# Patient Record
Sex: FEMALE | Race: WHITE | NOT HISPANIC OR LATINO | Employment: OTHER | URBAN - NONMETROPOLITAN AREA
[De-identification: names, ages, dates, MRNs, and addresses within clinical notes are randomized per-mention and may not be internally consistent; named-entity substitution may affect disease eponyms.]

---

## 2017-02-28 RX ORDER — CARVEDILOL 3.12 MG/1
TABLET ORAL
Qty: 180 TABLET | Refills: 2 | Status: SHIPPED | OUTPATIENT
Start: 2017-02-28

## 2017-09-25 ENCOUNTER — OFFICE VISIT (OUTPATIENT)
Dept: CARDIOLOGY | Facility: CLINIC | Age: 72
End: 2017-09-25

## 2017-09-25 VITALS
WEIGHT: 93 LBS | BODY MASS INDEX: 18.26 KG/M2 | DIASTOLIC BLOOD PRESSURE: 88 MMHG | HEART RATE: 68 BPM | HEIGHT: 60 IN | SYSTOLIC BLOOD PRESSURE: 146 MMHG

## 2017-09-25 DIAGNOSIS — R01.1 CARDIAC MURMUR: ICD-10-CM

## 2017-09-25 DIAGNOSIS — E78.00 PURE HYPERCHOLESTEROLEMIA: Primary | ICD-10-CM

## 2017-09-25 DIAGNOSIS — I34.0 NON-RHEUMATIC MITRAL REGURGITATION: ICD-10-CM

## 2017-09-25 DIAGNOSIS — I35.1 NONRHEUMATIC AORTIC VALVE INSUFFICIENCY: ICD-10-CM

## 2017-09-25 DIAGNOSIS — R06.02 SHORTNESS OF BREATH: ICD-10-CM

## 2017-09-25 PROCEDURE — 99213 OFFICE O/P EST LOW 20 MIN: CPT | Performed by: NURSE PRACTITIONER

## 2017-09-25 NOTE — PROGRESS NOTES
Chief Complaint   Patient presents with   • Follow-up     Denies chest pains and palpitations. Says she does have shortness of breath occasionally, just depends on what she has been doing. Labs per PCP in April.    • Med Refill     Denies needing medication refills.        Subjective       Augusta Acosta is a 71 y.o. female  with a history of dyslipidemia and anemia who also has trigeminal neuralgia.  Cardiac workup in 2015 was negative for ischemia and showed normal LV function. Moderate MR and AR reported. Cardiac clearance was given for trigeminal nerve surgery. Which was done without problem.   Today she comes to the office for a follow up visit and denies new or worsening cardiac symptoms. She has shortness of breath with exertion. She also reports being treated for iron deficiency anemia after last labs in April. I do not have a copy of lab report.         HPI         Cardiac History:    Past Surgical History:   Procedure Laterality Date   • CARDIOVASCULAR STRESS TEST  03/25/2015    Modified Stress-9 min. 71% THR, EF>65%, no ischemia   • ECHO - CONVERTED  03/25/2015    EF 60-65%, mod AR, mild MR, PA pressure 37 mmHg       Current Outpatient Prescriptions   Medication Sig Dispense Refill   • carvedilol (COREG) 3.125 MG tablet TAKE 1 TABLET TWICE DAILY 180 tablet 2   • methocarbamol (ROBAXIN) 500 MG tablet Take 500 mg by mouth as needed for muscle spasms.     • simvastatin (ZOCOR) 40 MG tablet Take 40 mg by mouth every night.       No current facility-administered medications for this visit.        Tegretol [carbamazepine]    Past Medical History:   Diagnosis Date   • Anemia    • H/O: hysterectomy    • Hemifacial spasm    • Hyperlipidemia        Social History     Social History   • Marital status:      Spouse name: N/A   • Number of children: N/A   • Years of education: N/A     Occupational History   • Not on file.     Social History Main Topics   • Smoking status: Never Smoker   • Smokeless tobacco:  "Never Used   • Alcohol use No   • Drug use: No   • Sexual activity: Not on file     Other Topics Concern   • Not on file     Social History Narrative       Family History   Problem Relation Age of Onset   • Stroke Mother    • Heart attack Father    • Heart attack Other        Review of Systems   Constitution: Negative for decreased appetite and malaise/fatigue.   HENT: Negative for congestion, ear pain and sore throat.    Eyes: Negative for blurred vision and visual disturbance.   Cardiovascular: Positive for near-syncope. Negative for chest pain, dyspnea on exertion, leg swelling and palpitations.   Respiratory: Positive for shortness of breath (no worse ). Negative for sleep disturbances due to breathing and snoring.    Endocrine: Negative for cold intolerance and heat intolerance.   Hematologic/Lymphatic: Negative for adenopathy. Does not bruise/bleed easily.   Skin: Negative for itching, nail changes and skin cancer.   Musculoskeletal: Positive for arthritis and back pain. Negative for falls and myalgias.   Gastrointestinal: Negative for abdominal pain, dysphagia, heartburn, melena and nausea.   Genitourinary: Negative for dysuria and frequency.   Neurological: Negative for dizziness, headaches, light-headedness, loss of balance, seizures, sensory change and vertigo.        Mild involuntary movement left side of face, same    Psychiatric/Behavioral: Negative for altered mental status and memory loss.   Allergic/Immunologic: Negative for environmental allergies and hives.    Diabetes- No  Thyroid-normal    Objective     /88 (BP Location: Right arm)  Pulse 68  Ht 60\" (152.4 cm)  Wt 93 lb (42.2 kg)  BMI 18.16 kg/m2    Physical Exam   Constitutional: She is oriented to person, place, and time. She appears well-nourished.   HENT:   Head: Normocephalic.   Eyes: Conjunctivae are normal. Pupils are equal, round, and reactive to light.   Neck: Normal range of motion. Neck supple. No JVD present. "   Cardiovascular: Normal rate, regular rhythm, S1 normal and S2 normal.    No murmur heard.  Pulmonary/Chest: Breath sounds normal. She has no wheezes. She has no rales.   Abdominal: Soft. Bowel sounds are normal. She exhibits no distension. There is no tenderness.   Musculoskeletal: Normal range of motion. She exhibits no edema or tenderness.   Neurological: She is alert and oriented to person, place, and time.   Skin: Skin is warm and dry. No rash noted. No pallor.   Psychiatric: She has a normal mood and affect. Her behavior is normal. Judgment and thought content normal.    Procedures        Assessment/Plan      Augusta was seen today for follow-up and med refill.    Diagnoses and all orders for this visit:    Pure hypercholesterolemia    Non-rheumatic mitral regurgitation    Nonrheumatic aortic valve insufficiency    Cardiac murmur    Shortness of breath      She follows with you for management of labs and medication refills. Please forward a copy of lab results. Regarding most recent lab patient reports her iron was low and took supplements for 3 months. I advised her to call your office for further evaluation/recommendations.   From a cardiac standpoint, Ms. Acosta appears stable. Her vital signs are normal. No worsen symptoms reported. She understands to call for chest pain, worsening shortness of breath, palpitations, dizziness or edema. No cardiac testing warranted at this time. Continue same dose statin and beta blocker.  At next will consider repeat echocardiogram to reassess cardiac murmur.              Electronically signed by HARDY Tuttle,  September 25, 2017 10:20 AM

## 2018-09-25 ENCOUNTER — OFFICE VISIT (OUTPATIENT)
Dept: CARDIOLOGY | Facility: CLINIC | Age: 73
End: 2018-09-25

## 2018-09-25 VITALS
WEIGHT: 89 LBS | BODY MASS INDEX: 17.47 KG/M2 | DIASTOLIC BLOOD PRESSURE: 80 MMHG | HEART RATE: 64 BPM | SYSTOLIC BLOOD PRESSURE: 142 MMHG | HEIGHT: 60 IN

## 2018-09-25 DIAGNOSIS — I35.1 NONRHEUMATIC AORTIC VALVE INSUFFICIENCY: ICD-10-CM

## 2018-09-25 DIAGNOSIS — D50.9 IRON DEFICIENCY ANEMIA, UNSPECIFIED IRON DEFICIENCY ANEMIA TYPE: ICD-10-CM

## 2018-09-25 DIAGNOSIS — E78.00 PURE HYPERCHOLESTEROLEMIA: Primary | ICD-10-CM

## 2018-09-25 DIAGNOSIS — I10 ESSENTIAL HYPERTENSION: ICD-10-CM

## 2018-09-25 DIAGNOSIS — I34.0 NON-RHEUMATIC MITRAL REGURGITATION: ICD-10-CM

## 2018-09-25 PROCEDURE — 99213 OFFICE O/P EST LOW 20 MIN: CPT | Performed by: NURSE PRACTITIONER

## 2018-09-25 NOTE — PATIENT INSTRUCTIONS
High-Protein and High-Calorie Diet  Eating high-protein and high-calorie foods can help you to gain weight, heal after an injury, and recover after an illness or surgery.  What is my plan?  The specific amount of daily protein and calories you need depends on:  · Your body weight.  · The reason this diet is recommended for you.    Generally, a high-protein, high-calorie diet involves:  · Eating 250-500 extra calories each day.  · Making sure that 10-35% of your daily calories come from protein.    Talk to your health care provider about how much protein and how many calories you need each day. Follow the diet as directed by your health care provider.  What do I need to know about this diet?  · Ask your health care provider if you should take a nutritional supplement.  · Try to eat six small meals each day instead of three large meals.  · Eat a balanced diet, including one food that is high in protein at each meal.  · Keep nutritious snacks handy, such as nuts, trail mixes, dried fruit, and yogurt.  · If you have kidney disease or diabetes, eating too much protein may put extra stress on your kidneys. Talk to your health care provider if you have either of those conditions.  What are some high-protein foods?  Grains  Quinoa. Bulgur wheat.  Vegetables  Soybeans. Peas.  Meats and Other Protein Sources  Beef, pork, and poultry. Fish and seafood. Eggs. Tofu. Textured vegetable protein (TVP). Peanut butter. Nuts and seeds. Dried beans. Protein powders.  Dairy  Whole milk. Whole-milk yogurt. Powdered milk. Cheese. Cottage Cheese. Eggnog.  Beverages  High-protein supplement drinks. Soy milk.  Other  Protein bars.  The items listed above may not be a complete list of recommended foods or beverages. Contact your dietitian for more options.  What are some high-calorie foods?  Grains  Pasta. Quick breads. Muffins. Pancakes. Ready-to-eat cereal.  Vegetables  Vegetables cooked in oil or butter. Fried potatoes.  Fruits  Dried  fruit. Fruit leather. Canned fruit in syrup. Fruit juice. Avocados.  Meats and Other Protein Sources  Peanut butter. Nuts and seeds.  Dairy  Heavy cream. Whipped cream. Cream cheese. Sour cream. Ice cream. Custard. Pudding.  Beverages  Meal-replacement beverages. Nutrition shakes. Fruit juice. Sugar-sweetened soft drinks.  Condiments  Salad dressing. Mayonnaise. Javan sauce. Fruit preserves or jelly. Honey. Syrup.  Sweets/Desserts  Cake. Cookies. Pie. Pastries. Candy bars. Chocolate.  Fats and Oils  Butter or margarine. Oil. Gravy.  Other  Meal-replacement bars.  The items listed above may not be a complete list of recommended foods or beverages. Contact your dietitian for more options.  What are some tips for including high-protein and high-calorie foods in my diet?  · Add whole milk, half-and-half, or heavy cream to cereal, pudding, soup, or hot cocoa.  · Add whole milk to instant breakfast drinks.  · Add peanut butter to oatmeal or smoothies.  · Add powdered milk to baked goods, smoothies, or milkshakes.  · Add powdered milk, cream, or butter to mashed potatoes.  · Add cheese to cooked vegetables.  · Make whole-milk yogurt parfaits. Top them with granola, fruit, or nuts.  · Add cottage cheese to your fruit.  · Add avocados, cheese, or both to sandwiches or salads.  · Add meat, poultry, or seafood to rice, pasta, casseroles, salads, and soups.  · Use mayonnaise when making egg salad, chicken salad, or tuna salad.  · Use peanut butter as a topping for pretzels, celery, or crackers.  · Add beans to casseroles, dips, and spreads.  · Add pureed beans to sauces and soups.  · Replace calorie-free drinks with calorie-containing drinks, such as milk and fruit juice.  This information is not intended to replace advice given to you by your health care provider. Make sure you discuss any questions you have with your health care provider.  Document Released: 12/18/2006 Document Revised: 05/25/2017 Document Reviewed:  06/02/2015  Elsevier Interactive Patient Education © 2018 [employer]

## 2018-09-25 NOTE — PROGRESS NOTES
Chief Complaint   Patient presents with   • Follow-up     Cardiac management. No labs in the last year. PCP writes refills on medications.   • Shortness of Breath     Has some with exertion and while sitting, feels related to problems with back. She reports is not any worse than before.   • Aspirin     Patient does not take.       Subjective       Augusta cAosta is a 72 y.o. female with a history of dyslipidemia, significant scoliosis, and anemia who also has trigeminal neuralgia.  Cardiac workup in 2015 was negative for ischemia and showed normal LV function. Moderate MR and AR reported. Cardiac clearance was given for trigeminal nerve surgery which was done without problem. She came in today for her yearly follow up. She feels well. Denies chest pain. She does have some shortness of breath she feels related to not able to take deep breaths secondary to back stiffness. Labs have not been done recently. She takes carvedilol and simvastatin only. No refills needed.     HPI         Cardiac History:    Past Surgical History:   Procedure Laterality Date   • CARDIOVASCULAR STRESS TEST  03/25/2015    Modified Stress-9 min. 71% THR, EF>65%, no ischemia   • ECHO - CONVERTED  03/25/2015    EF 60-65%, mod AR, mild MR, PA pressure 37 mmHg       Current Outpatient Prescriptions   Medication Sig Dispense Refill   • carvedilol (COREG) 3.125 MG tablet TAKE 1 TABLET TWICE DAILY 180 tablet 2   • simvastatin (ZOCOR) 40 MG tablet Take 40 mg by mouth every night.       No current facility-administered medications for this visit.        Tegretol [carbamazepine]    Past Medical History:   Diagnosis Date   • Anemia    • H/O: hysterectomy    • Hemifacial spasm    • Hyperlipidemia        Social History     Social History   • Marital status:      Spouse name: N/A   • Number of children: N/A   • Years of education: N/A     Occupational History   • Not on file.     Social History Main Topics   • Smoking status: Never Smoker   •  "Smokeless tobacco: Never Used   • Alcohol use No   • Drug use: No   • Sexual activity: Not on file     Other Topics Concern   • Not on file     Social History Narrative   • No narrative on file       Family History   Problem Relation Age of Onset   • Stroke Mother    • Heart attack Father    • Heart attack Other        Review of Systems   Constitution: Positive for weight loss (down 4 lb ). Negative for decreased appetite and weakness.   HENT: Negative.    Eyes: Negative.    Cardiovascular: Negative for chest pain, leg swelling, orthopnea, palpitations and syncope.   Respiratory: Positive for shortness of breath. Negative for cough.    Endocrine: Negative.    Hematologic/Lymphatic: Negative.    Skin: Negative.    Musculoskeletal: Positive for arthritis and back pain. Negative for myalgias.   Gastrointestinal: Negative for abdominal pain and melena.   Genitourinary: Negative for dysuria and hematuria.   Neurological: Negative for dizziness.   Psychiatric/Behavioral: Negative for altered mental status and depression.   Allergic/Immunologic: Negative.         Diabetes- No  Thyroid-normal    Objective     /80 (BP Location: Left arm)   Pulse 64   Ht 152.4 cm (60\")   Wt 40.4 kg (89 lb)   BMI 17.38 kg/m²     Physical Exam   Constitutional: She is oriented to person, place, and time. She appears well-developed. No distress.   She is thin, frail    HENT:   Head: Normocephalic.   Eyes: Pupils are equal, round, and reactive to light.   Neck: Normal range of motion.   Cardiovascular: Normal rate, regular rhythm and intact distal pulses.    Murmur heard.  Pulmonary/Chest: Effort normal.   Abdominal: Soft. Bowel sounds are normal.   Musculoskeletal: She exhibits deformity (spinal curvature). She exhibits no edema.   Neurological: She is alert and oriented to person, place, and time.   Skin: Skin is warm and dry. She is not diaphoretic.   Psychiatric: She has a normal mood and affect.   Nursing note and vitals " "reviewed.     Procedures          Assessment/Plan    Heart rate is stable. Blood pressure upper limit of normal. We reviewed the stress test and echocardiogram. I explained to her about the aortic and mitral regurgitation. She remains mostly asymptomatic, so no further work up ordered today. She is advised to have labs done to check lipids, CBC, CMP, and TSH. Copy will be forwarded to you. Her weight is down 4 pounds to 89. Her appetite is just \"so-so\". We discussed high protein foods. Literature provided. No changes in medications. No refills needed. Limit sodium to 1,500 mg daily. We will see her back in one year or sooner if needed.   Augusta was seen today for follow-up, shortness of breath and aspirin.    Diagnoses and all orders for this visit:    Pure hypercholesterolemia  -     CBC (No Diff); Future  -     Comprehensive Metabolic Panel; Future  -     Lipid Panel; Future  -     TSH; Future    Non-rheumatic mitral regurgitation  -     CBC (No Diff); Future  -     Comprehensive Metabolic Panel; Future  -     Lipid Panel; Future  -     TSH; Future    Nonrheumatic aortic valve insufficiency  -     CBC (No Diff); Future  -     Comprehensive Metabolic Panel; Future  -     Lipid Panel; Future  -     TSH; Future    Iron deficiency anemia, unspecified iron deficiency anemia type  -     CBC (No Diff); Future  -     Comprehensive Metabolic Panel; Future  -     Lipid Panel; Future  -     TSH; Future    Essential hypertension  -     CBC (No Diff); Future  -     Comprehensive Metabolic Panel; Future  -     Lipid Panel; Future  -     TSH; Future        Patient's Body mass index is 17.38 kg/m². BMI is below normal parameters. Recommendations include: referral to primary care.                        Electronically signed by HARDY Davis,  September 27, 2018 11:47 AM  "

## 2019-08-05 DIAGNOSIS — D50.0 IRON DEFICIENCY ANEMIA DUE TO CHRONIC BLOOD LOSS: Primary | ICD-10-CM

## 2019-08-12 ENCOUNTER — HOSPITAL ENCOUNTER (OUTPATIENT)
Dept: PREOP | Facility: HOSPITAL | Age: 74
Setting detail: HOSPITAL OUTPATIENT SURGERY
Discharge: HOME | End: 2019-08-12
Admitting: SURGERY

## 2019-08-12 VITALS
WEIGHT: 80 LBS | TEMPERATURE: 97.8 F | OXYGEN SATURATION: 97 % | HEART RATE: 83 BPM | RESPIRATION RATE: 20 BRPM | SYSTOLIC BLOOD PRESSURE: 184 MMHG | DIASTOLIC BLOOD PRESSURE: 103 MMHG | BODY MASS INDEX: 18 KG/M2 | HEIGHT: 56 IN

## 2019-08-12 DIAGNOSIS — D50.0 IRON DEFICIENCY ANEMIA DUE TO CHRONIC BLOOD LOSS: ICD-10-CM

## 2019-08-12 PROCEDURE — 91110 GI TRC IMG INTRAL ESOPH-ILE: CPT

## 2019-08-12 NOTE — NURSING NOTE
Pt unable to swallow pill for procedure. Pt ambulated self to car in stable and satisfactory condition.